# Patient Record
Sex: FEMALE | Race: WHITE | Employment: PART TIME | URBAN - METROPOLITAN AREA
[De-identification: names, ages, dates, MRNs, and addresses within clinical notes are randomized per-mention and may not be internally consistent; named-entity substitution may affect disease eponyms.]

---

## 2017-02-07 PROCEDURE — 80061 LIPID PANEL: CPT | Performed by: INTERNAL MEDICINE

## 2017-02-07 PROCEDURE — 36415 COLL VENOUS BLD VENIPUNCTURE: CPT | Performed by: INTERNAL MEDICINE

## 2017-02-07 PROCEDURE — 86140 C-REACTIVE PROTEIN: CPT | Performed by: INTERNAL MEDICINE

## 2017-02-07 PROCEDURE — 82306 VITAMIN D 25 HYDROXY: CPT | Performed by: INTERNAL MEDICINE

## 2017-02-07 PROCEDURE — 83036 HEMOGLOBIN GLYCOSYLATED A1C: CPT | Performed by: INTERNAL MEDICINE

## 2021-04-27 PROBLEM — E04.1 THYROID NODULE: Status: ACTIVE | Noted: 2021-04-27

## 2021-04-27 PROBLEM — N83.202 CYSTS OF BOTH OVARIES: Status: ACTIVE | Noted: 2021-04-27

## 2021-04-27 PROBLEM — N83.201 CYSTS OF BOTH OVARIES: Status: ACTIVE | Noted: 2021-04-27

## 2021-04-27 PROBLEM — D21.9 FIBROID: Status: ACTIVE | Noted: 2021-04-27

## 2021-10-25 ENCOUNTER — HOSPITAL ENCOUNTER (EMERGENCY)
Facility: HOSPITAL | Age: 51
Discharge: HOME | End: 2021-10-25
Attending: EMERGENCY MEDICINE
Payer: COMMERCIAL

## 2021-10-25 VITALS
RESPIRATION RATE: 22 BRPM | TEMPERATURE: 98 F | WEIGHT: 210 LBS | HEIGHT: 64 IN | DIASTOLIC BLOOD PRESSURE: 100 MMHG | SYSTOLIC BLOOD PRESSURE: 147 MMHG | HEART RATE: 68 BPM | OXYGEN SATURATION: 91 % | BODY MASS INDEX: 35.85 KG/M2

## 2021-10-25 DIAGNOSIS — M54.32 SCIATICA OF LEFT SIDE: Primary | ICD-10-CM

## 2021-10-25 PROCEDURE — 99283 EMERGENCY DEPT VISIT LOW MDM: CPT

## 2021-10-25 RX ORDER — TRAMADOL HYDROCHLORIDE 50 MG/1
50 TABLET ORAL EVERY 6 HOURS PRN
Qty: 10 TABLET | Refills: 0 | Status: SHIPPED | OUTPATIENT
Start: 2021-10-25 | End: 2021-11-01

## 2021-10-25 RX ORDER — GABAPENTIN 100 MG/1
100 CAPSULE ORAL ONCE
Status: COMPLETED | OUTPATIENT
Start: 2021-10-25 | End: 2021-10-25

## 2021-10-25 RX ORDER — TRAMADOL HYDROCHLORIDE 50 MG/1
50 TABLET ORAL ONCE
Status: COMPLETED | OUTPATIENT
Start: 2021-10-25 | End: 2021-10-25

## 2021-10-25 RX ORDER — GABAPENTIN 100 MG/1
100 CAPSULE ORAL 2 TIMES DAILY
Qty: 60 CAPSULE | Refills: 0 | Status: SHIPPED | OUTPATIENT
Start: 2021-10-25 | End: 2021-11-09

## 2021-10-25 NOTE — ED INITIAL ASSESSMENT (HPI)
Pt states having sciatic nerve pain. States having lt lower back pain since last week. Was prescribed prednisone, helped for a little bit, but now more pain, lt heel and outside of leg.  States not having incidents of incontinence, but doesn't always realiz

## 2021-10-26 ENCOUNTER — TELEPHONE (OUTPATIENT)
Dept: NEUROLOGY | Facility: CLINIC | Age: 51
End: 2021-10-26

## 2021-10-26 NOTE — TELEPHONE ENCOUNTER
Pt F/U ED visit last night she was referred to [facility] and will like on appointment ASAP.  Please advice ( Nerve pain, numbness on left leg patient is currently in a lot of pain)

## 2021-10-26 NOTE — ED PROVIDER NOTES
Patient Seen in: [facility] AND [facility] Emergency Department      History   Patient presents with:  Back Pain    Stated Complaint: sciatica    Subjective:   HPI    11-year-old female with history of chronic back pain, here with complaints of left-sided lower Negative for dizziness. Psychiatric/Behavioral: Negative for suicidal ideas. All other systems reviewed and are negative. Positive for stated complaint: sciatica  Other systems are as noted in HPI. Constitutional and vital signs reviewed.       Al Patient's condition was stable during Emergency Department evaluation.      51yoF with back pain  - I personally reviewed and interpreted all the ED vitals  - afebrile, hemodynamically stable  - tramadol, gabapentin ordered  - post void residual        The mouth every 6 (six) hours as needed. Qty: 10 tablet Refills: 0    gabapentin 100 MG Oral Cap  Take 1 capsule (100 mg total) by mouth 2 (two) times daily.   Qty: 60 capsule Refills: 0

## 2021-10-28 ENCOUNTER — TELEPHONE (OUTPATIENT)
Dept: PHYSICAL MEDICINE AND REHAB | Facility: CLINIC | Age: 51
End: 2021-10-28

## 2021-10-28 NOTE — TELEPHONE ENCOUNTER
This patient has never been seen at [facility]    Dr. Yogesh Quiles has a opening today at 1p.   Please call patient and offer appt

## 2021-11-01 ENCOUNTER — TELEPHONE (OUTPATIENT)
Dept: NEUROLOGY | Facility: CLINIC | Age: 51
End: 2021-11-01

## 2021-11-01 ENCOUNTER — OFFICE VISIT (OUTPATIENT)
Dept: PHYSICAL MEDICINE AND REHAB | Facility: CLINIC | Age: 51
End: 2021-11-01
Payer: COMMERCIAL

## 2021-11-01 ENCOUNTER — HOSPITAL ENCOUNTER (OUTPATIENT)
Dept: GENERAL RADIOLOGY | Facility: HOSPITAL | Age: 51
Discharge: HOME | End: 2021-11-01
Attending: PHYSICAL MEDICINE & REHABILITATION
Payer: COMMERCIAL

## 2021-11-01 VITALS
SYSTOLIC BLOOD PRESSURE: 132 MMHG | DIASTOLIC BLOOD PRESSURE: 80 MMHG | BODY MASS INDEX: 35.85 KG/M2 | WEIGHT: 210 LBS | HEIGHT: 64 IN

## 2021-11-01 DIAGNOSIS — M54.16 LUMBAR RADICULOPATHY: ICD-10-CM

## 2021-11-01 DIAGNOSIS — M54.16 LUMBAR RADICULOPATHY: Primary | ICD-10-CM

## 2021-11-01 PROCEDURE — 99244 OFF/OP CNSLTJ NEW/EST MOD 40: CPT | Performed by: PHYSICAL MEDICINE & REHABILITATION

## 2021-11-01 PROCEDURE — 72100 X-RAY EXAM L-S SPINE 2/3 VWS: CPT | Performed by: PHYSICAL MEDICINE & REHABILITATION

## 2021-11-01 PROCEDURE — 3075F SYST BP GE 130 - 139MM HG: CPT | Performed by: PHYSICAL MEDICINE & REHABILITATION

## 2021-11-01 PROCEDURE — 3008F BODY MASS INDEX DOCD: CPT | Performed by: PHYSICAL MEDICINE & REHABILITATION

## 2021-11-01 PROCEDURE — 3079F DIAST BP 80-89 MM HG: CPT | Performed by: PHYSICAL MEDICINE & REHABILITATION

## 2021-11-01 NOTE — PROGRESS NOTES
Low Back Pain H & P    Chief Complaint:  Patient presents with:  Back Pain: New Patient here for scatica on left side travels to toes. Patient states started this summer.   Patient went ER on 10/25/21      HPI:  Camilo Jackson is a 45year old year old able to participate as much due to the pain. Extension in the first session increased her pain and more neutral spine was better for her. Description of the Pain  · The pain is located in the left buttock.     · The pain radiates to the left posterior t Vaping Use: Never used    Substance and Sexual Activity      Alcohol use:  Yes        Alcohol/week: 0.0 standard drinks        Comment: rare      Drug use: No      Sexual activity: Not on file    Other Topics      Concerns:        Not on file    Social Hist funny. The patient reports constipation and diarrhea. :   The patient denies urinary problems. Endocrine:  Positive for cold and heat intolerance.   Neuro:   Negative for headaches, memory impairment  Psych:   Negative for anxiety, depression, but posi except:  Decreased in the  lateral  medial Right thigh   medial Right Lower Leg  lateral  Left Lower Leg  arch of the LEFT foot  lateral LEFT foot   LE Muscle Strength:  All LE strength measurements 5/5 except:  Hamstring RIGHT:   4+/5  Hamstring LEFT:   4-

## 2021-11-01 NOTE — PATIENT INSTRUCTIONS
Plan  She will get a MRI of the lumbar spine due to her worsening pain and inability to do the PT effectively. She will also have lumbar spine x-rays. She will call me once she has had the above.     She will see the spine surgeon once she has had h

## 2021-11-01 NOTE — TELEPHONE ENCOUNTER
[employer] for authorization of approval for MRI L-spine wo cpt code 01001. Authorization # [id number] effective 11/01/2021 - 12/30/2021.  L/m advising of approval.

## 2021-11-02 ENCOUNTER — HOSPITAL ENCOUNTER (OUTPATIENT)
Dept: MRI IMAGING | Facility: HOSPITAL | Age: 51
Discharge: HOME | End: 2021-11-02
Attending: PHYSICAL MEDICINE & REHABILITATION
Payer: COMMERCIAL

## 2021-11-02 DIAGNOSIS — M54.16 LUMBAR RADICULOPATHY: ICD-10-CM

## 2021-11-02 PROBLEM — M51.26 LUMBAR HERNIATED DISC: Status: ACTIVE | Noted: 2021-11-02

## 2021-11-02 PROCEDURE — 72148 MRI LUMBAR SPINE W/O DYE: CPT | Performed by: PHYSICAL MEDICINE & REHABILITATION

## 2021-11-03 ENCOUNTER — TELEPHONE (OUTPATIENT)
Dept: NEUROLOGY | Facility: CLINIC | Age: 51
End: 2021-11-03

## 2021-11-03 PROBLEM — M51.16 DISPLACEMENT OF LUMBAR INTERVERTEBRAL DISC WITH RADICULOPATHY: Status: ACTIVE | Noted: 2021-11-01

## 2021-11-03 PROBLEM — M54.42 CHRONIC BILATERAL LOW BACK PAIN WITH LEFT-SIDED SCIATICA: Status: ACTIVE | Noted: 2021-11-03

## 2021-11-03 PROBLEM — G89.29 CHRONIC BILATERAL LOW BACK PAIN WITH LEFT-SIDED SCIATICA: Status: ACTIVE | Noted: 2021-11-03

## 2021-11-03 NOTE — TELEPHONE ENCOUNTER
[employer] for authorization of approval for Left S1 TFESI cpt codes 08435-OF, [id number]. Authorization # [id number] effective 11/03/2021 - 12/02/2021. Will inform Nursing.

## 2021-11-08 ENCOUNTER — TELEPHONE (OUTPATIENT)
Dept: PHYSICAL MEDICINE AND REHAB | Facility: CLINIC | Age: 51
End: 2021-11-08

## 2021-11-08 NOTE — TELEPHONE ENCOUNTER
Spoke to patient and informed her of the results as interpreted by Dr. Janice Stanton. Patient states she was seen by Lito Santos who recommended she see the [facility] Pain Specialist which they got her in the next day and performed eden Saturday 11/6/21.   Patient s

## 2021-11-08 NOTE — TELEPHONE ENCOUNTER
----- Message from Radha Koch MD sent at 11/2/2021  7:08 PM CDT -----  Normal x-ray    Pedro Luis Tuttle MD [facility] Nurse  L5-S1 left sided moderate herniated disc.  Please call to schedule for a left S1 TFESI

## 2021-11-09 PROBLEM — M54.16 ACUTE LEFT LUMBAR RADICULOPATHY: Status: ACTIVE | Noted: 2021-11-09

## 2021-11-22 PROBLEM — M54.16 ACUTE LEFT LUMBAR RADICULOPATHY: Status: RESOLVED | Noted: 2021-11-09 | Resolved: 2021-11-22

## 2024-09-16 ENCOUNTER — APPOINTMENT (OUTPATIENT)
Dept: CT IMAGING | Facility: HOSPITAL | Age: 54
End: 2024-09-16
Attending: EMERGENCY MEDICINE
Payer: COMMERCIAL

## 2024-09-16 ENCOUNTER — HOSPITAL ENCOUNTER (EMERGENCY)
Facility: HOSPITAL | Age: 54
Discharge: HOME | End: 2024-09-16
Attending: EMERGENCY MEDICINE
Payer: COMMERCIAL

## 2024-09-16 VITALS
TEMPERATURE: 99 F | RESPIRATION RATE: 16 BRPM | OXYGEN SATURATION: 99 % | WEIGHT: 195 LBS | DIASTOLIC BLOOD PRESSURE: 71 MMHG | BODY MASS INDEX: 33.29 KG/M2 | HEIGHT: 64 IN | SYSTOLIC BLOOD PRESSURE: 148 MMHG | HEART RATE: 80 BPM

## 2024-09-16 DIAGNOSIS — K57.92 ACUTE DIVERTICULITIS: Primary | ICD-10-CM

## 2024-09-16 LAB
ALBUMIN SERPL-MCNC: 4.5 G/DL (ref 3.2–4.8)
ALP LIVER SERPL-CCNC: 117 U/L
ALT SERPL-CCNC: 12 U/L
ANION GAP SERPL CALC-SCNC: 9 MMOL/L (ref 0–18)
AST SERPL-CCNC: 18 U/L (ref ?–34)
BASOPHILS # BLD AUTO: 0.1 X10(3) UL (ref 0–0.2)
BASOPHILS NFR BLD AUTO: 0.6 %
BILIRUB DIRECT SERPL-MCNC: 0.3 MG/DL (ref ?–0.3)
BILIRUB SERPL-MCNC: 1 MG/DL (ref 0.3–1.2)
BILIRUB UR QL: NEGATIVE
BUN BLD-MCNC: 11 MG/DL (ref 9–23)
BUN/CREAT SERPL: 13.1 (ref 10–20)
CALCIUM BLD-MCNC: 9.9 MG/DL (ref 8.7–10.4)
CHLORIDE SERPL-SCNC: 103 MMOL/L (ref 98–112)
CLARITY UR: CLEAR
CO2 SERPL-SCNC: 25 MMOL/L (ref 21–32)
COLOR UR: YELLOW
CREAT BLD-MCNC: 0.84 MG/DL
DEPRECATED RDW RBC AUTO: 39.7 FL (ref 35.1–46.3)
EGFRCR SERPLBLD CKD-EPI 2021: 82 ML/MIN/1.73M2 (ref 60–?)
EOSINOPHIL # BLD AUTO: 0.03 X10(3) UL (ref 0–0.7)
EOSINOPHIL NFR BLD AUTO: 0.2 %
ERYTHROCYTE [DISTWIDTH] IN BLOOD BY AUTOMATED COUNT: 14.1 % (ref 11–15)
GLUCOSE BLD-MCNC: 157 MG/DL (ref 70–99)
GLUCOSE UR-MCNC: NEGATIVE MG/DL
HCT VFR BLD AUTO: 42 %
HGB BLD-MCNC: 14.4 G/DL
IMM GRANULOCYTES # BLD AUTO: 0.06 X10(3) UL (ref 0–1)
IMM GRANULOCYTES NFR BLD: 0.4 %
KETONES UR-MCNC: NEGATIVE MG/DL
LYMPHOCYTES # BLD AUTO: 2.08 X10(3) UL (ref 1–4)
LYMPHOCYTES NFR BLD AUTO: 12.3 %
MCH RBC QN AUTO: 26.9 PG (ref 26–34)
MCHC RBC AUTO-ENTMCNC: 34.3 G/DL (ref 31–37)
MCV RBC AUTO: 78.4 FL
MONOCYTES # BLD AUTO: 0.94 X10(3) UL (ref 0.1–1)
MONOCYTES NFR BLD AUTO: 5.6 %
NEUTROPHILS # BLD AUTO: 13.64 X10 (3) UL (ref 1.5–7.7)
NEUTROPHILS # BLD AUTO: 13.64 X10(3) UL (ref 1.5–7.7)
NEUTROPHILS NFR BLD AUTO: 80.9 %
NITRITE UR QL STRIP.AUTO: NEGATIVE
OSMOLALITY SERPL CALC.SUM OF ELEC: 287 MOSM/KG (ref 275–295)
PH UR: 6 [PH] (ref 5–8)
PLATELET # BLD AUTO: 352 10(3)UL (ref 150–450)
POTASSIUM SERPL-SCNC: 3.9 MMOL/L (ref 3.5–5.1)
PROT SERPL-MCNC: 7.7 G/DL (ref 5.7–8.2)
PROT UR-MCNC: NEGATIVE MG/DL
RBC # BLD AUTO: 5.36 X10(6)UL
RBC #/AREA URNS AUTO: >10 /HPF
SODIUM SERPL-SCNC: 137 MMOL/L (ref 136–145)
SP GR UR STRIP: 1.02 (ref 1–1.03)
UROBILINOGEN UR STRIP-ACNC: 0.2
WBC # BLD AUTO: 16.9 X10(3) UL (ref 4–11)
WBC #/AREA URNS AUTO: >50 /HPF

## 2024-09-16 PROCEDURE — 80076 HEPATIC FUNCTION PANEL: CPT | Performed by: EMERGENCY MEDICINE

## 2024-09-16 PROCEDURE — 96361 HYDRATE IV INFUSION ADD-ON: CPT

## 2024-09-16 PROCEDURE — 74177 CT ABD & PELVIS W/CONTRAST: CPT | Performed by: EMERGENCY MEDICINE

## 2024-09-16 PROCEDURE — 81015 MICROSCOPIC EXAM OF URINE: CPT | Performed by: EMERGENCY MEDICINE

## 2024-09-16 PROCEDURE — 87086 URINE CULTURE/COLONY COUNT: CPT | Performed by: EMERGENCY MEDICINE

## 2024-09-16 PROCEDURE — 96374 THER/PROPH/DIAG INJ IV PUSH: CPT

## 2024-09-16 PROCEDURE — 99284 EMERGENCY DEPT VISIT MOD MDM: CPT

## 2024-09-16 PROCEDURE — 80048 BASIC METABOLIC PNL TOTAL CA: CPT | Performed by: EMERGENCY MEDICINE

## 2024-09-16 PROCEDURE — 85025 COMPLETE CBC W/AUTO DIFF WBC: CPT | Performed by: EMERGENCY MEDICINE

## 2024-09-16 PROCEDURE — 81001 URINALYSIS AUTO W/SCOPE: CPT | Performed by: EMERGENCY MEDICINE

## 2024-09-16 RX ORDER — KETOROLAC TROMETHAMINE 15 MG/ML
15 INJECTION, SOLUTION INTRAMUSCULAR; INTRAVENOUS ONCE
Status: COMPLETED | OUTPATIENT
Start: 2024-09-16 | End: 2024-09-16

## 2024-09-16 NOTE — ED INITIAL ASSESSMENT (HPI)
Right lower abdominal/pelvic pain for a few days, worse today. States constipation. Denies n/v/urinary concerns

## 2024-09-16 NOTE — ED PROVIDER NOTES
Patient Seen in: [facility] Emergency Department    History     Chief Complaint   Patient presents with    Abdominal Pain       HPI    54-year-old female here with 4 days of suprapubic abdominal pain, radiates to the right lower quadrant.  Denies any diarrhea or vomiting or fevers.  No urinary symptoms    History reviewed.   Past Medical History:    ACL tear    (R)    Anxiety state    Depression    Disorder of thyroid    Esophageal reflux    GERD    Hematuria    IBS (irritable bowel syndrome)    Neuropathy    LEFT LEG TOE AND HEEL NUMB    Obesity    PCOS (polycystic ovarian syndrome)    Pneumonia    Vitamin D deficiency       History reviewed.   Past Surgical History:   Procedure Laterality Date          2x    Cholecystectomy      Colonoscopy      D & c      Egd      Other surgical history      ACL reconstruction 3/2010         Medications :  (Not in a hospital admission)       Family History   Problem Relation Age of Onset    Cancer Father     Diabetes Mother     Thyroid Disorder Mother     Cancer Brother     Thyroid Disorder Brother     Heart Disorder Paternal Grandmother        Smoking Status:   Social History     Socioeconomic History    Marital status:     Number of children: 2   Occupational History    Occupation: marketing communications   Tobacco Use    Smoking status: Never    Smokeless tobacco: Never   Vaping Use    Vaping status: Never Used   Substance and Sexual Activity    Alcohol use: Yes     Alcohol/week: 0.0 standard drinks of alcohol     Comment: rare    Drug use: No       Constitutional and vital signs reviewed.      Social History and Family History elements reviewed from today, pertinent positives to the presenting problem noted.    Physical Exam     ED Triage Vitals [24 0628]   /74   Pulse 106   Resp 20   Temp 99 °F (37.2 °C)   Temp src Oral   SpO2 95 %   O2 Device None (Room air)       All measures to prevent infection transmission during my  interaction with the patient were taken. The patient was already wearing a droplet mask on my arrival to the room. Personal protective equipment was worn throughout the duration of the exam.  Handwashing was performed prior to and after the exam.  Stethoscope and any equipment used during my examination was cleaned with super sani-cloth germicidal wipes following the exam.     Physical Exam    General: NAD  Head: Normocephalic and atraumatic.  Mouth/Throat/Ears/Nose: Oropharynx is clear   Eyes: Conjunctivae and EOM are normal.   Neck: Normal range of motion. Supple.   Cardiovascular: Normal rate, regular rhythm, normal heart sounds.  Respiratory/Chest: Clear and equal bilaterally. Exhibits no tenderness.  Gastrointestinal: Soft, suprapubic ttp, non-distended. Bowel sounds are normal.   Musculoskeletal:No swelling or deformity.   Neurological: Alert and appropriate. No focal deficits.   Skin: Skin is warm and dry. No pallor.  Psychiatric: Has a normal mood and affect.      ED Course        Labs Reviewed   URINALYSIS WITH CULTURE REFLEX - Abnormal; Notable for the following components:       Result Value    Blood Urine Moderate (*)     Leukocyte Esterase Urine Small (*)     All other components within normal limits   UA MICROSCOPIC ONLY, URINE - Abnormal; Notable for the following components:    WBC Urine >50 (*)     RBC Urine >10 (*)     Squamous Epi. Cells Few (*)     All other components within normal limits   BASIC METABOLIC PANEL (8) - Abnormal; Notable for the following components:    Glucose 157 (*)     All other components within normal limits   HEPATIC FUNCTION PANEL (7) - Abnormal; Notable for the following components:    Alkaline Phosphatase 117 (*)     All other components within normal limits   CBC WITH DIFFERENTIAL WITH PLATELET - Abnormal; Notable for the following components:    WBC 16.9 (*)     RBC 5.36 (*)     MCV 78.4 (*)     Neutrophil Absolute Prelim 13.64 (*)     Neutrophil Absolute 13.64 (*)      All other components within normal limits   RAINBOW DRAW LAVENDER   RAINBOW DRAW LIGHT GREEN   RAINBOW DRAW BLUE   RAINBOW DRAW GOLD   URINE CULTURE, ROUTINE       As Interpreted by me    Imaging Results Available and Reviewed while in ED: No results found.  ED Medications Administered:   Medications   ketorolac (Toradol) 15 MG/ML injection 15 mg (15 mg Intravenous Given 9/16/24 0713)   sodium chloride 0.9 % IV bolus 1,000 mL (0 mL Intravenous Stopped 9/16/24 0845)   iopamidol 76% (ISOVUE-370) injection for power injector (80 mL Intravenous Given 9/16/24 0801)         MDM     Vitals:    09/16/24 0628 09/16/24 0645 09/16/24 0715 09/16/24 0845   BP: 123/74 129/73 151/73 148/71   Pulse: 106 95 86 80   Resp: 20 20 18 16   Temp: 99 °F (37.2 °C)      TempSrc: Oral      SpO2: 95% 96% 95% 99%   Weight: 88.5 kg      Height: 162.6 cm (5' 4\")        *I personally reviewed and interpreted all ED vitals.    Pulse Ox: 95%, Room air, Normal       Medical Decision Making      Differential Diagnosis/ Diagnostic Considerations: Diverticulitis, UTI    Complicating Factors: The patient already has diverticulosis to contribute to the complexity of this ED evaluation.    I reviewed prior chart records including colonoscopy operative note from December 6, 2023 when patient was found to have severe diverticulosis.  Patient is here with acute diverticulitis on CT scan, agree with report.  Labs reviewed and unremarkable for acute findings.   Recommended liquid diet and starting antibiotics.  Discharged to home in stable condition.  Considered admission however pain is controlled and patient is hemodynamically stable and with uncomplicated diverticulitis.  She is comfortable with the plan.  Also told to have nonemergent pelvic ultrasound for follow-up of the left adnexal cystic lesion found on CT.    Dc In stable condition.  Patient is comfortable with the plan.  Prescriptions: As above    Disposition and Plan     Clinical Impression:  1.  Acute diverticulitis        Disposition:  Discharge    Follow-up:  Maddison Hopper MD  [address] [place] [address]  [address]  [facility] [id number] [phone]    Schedule an appointment as soon as possible for a visit in 1 day(s)        Medications Prescribed:  Discharge Medication List as of 9/16/2024  8:45 AM        START taking these medications    Details   amoxicillin clavulanate 875-125 MG Oral Tab Take 1 tablet by mouth 2 (two) times daily for 10 days., Normal, Disp-20 tablet, R-0

## 2024-09-16 NOTE — DISCHARGE INSTRUCTIONS
Return if any worsening symptoms.  Please do start a liquid diet for at least the next 5 days, progress your diet with  soft foods and then solid foods should your pain continue to be improving.